# Patient Record
Sex: FEMALE | Race: WHITE
[De-identification: names, ages, dates, MRNs, and addresses within clinical notes are randomized per-mention and may not be internally consistent; named-entity substitution may affect disease eponyms.]

---

## 2018-03-04 ENCOUNTER — HOSPITAL ENCOUNTER (EMERGENCY)
Dept: HOSPITAL 45 - C.EDB | Age: 28
Discharge: HOME | End: 2018-03-04
Payer: COMMERCIAL

## 2018-03-04 VITALS
HEIGHT: 62.01 IN | WEIGHT: 145.51 LBS | HEIGHT: 62.01 IN | BODY MASS INDEX: 26.44 KG/M2 | WEIGHT: 145.51 LBS | BODY MASS INDEX: 26.44 KG/M2

## 2018-03-04 VITALS
DIASTOLIC BLOOD PRESSURE: 96 MMHG | TEMPERATURE: 98.06 F | HEART RATE: 114 BPM | SYSTOLIC BLOOD PRESSURE: 148 MMHG | OXYGEN SATURATION: 97 %

## 2018-03-04 DIAGNOSIS — G43.909: ICD-10-CM

## 2018-03-04 DIAGNOSIS — Z79.899: ICD-10-CM

## 2018-03-04 DIAGNOSIS — F41.9: ICD-10-CM
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
## 2018-03-04 DIAGNOSIS — S60.221A: Primary | ICD-10-CM
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
## 2018-03-04 NOTE — DIAGNOSTIC IMAGING REPORT
RIGHT HAND 3 VIEWS



CLINICAL HISTORY: Right hand injury.



FINDINGS: 3 views of the right hand are obtained. No prior studies are available

for comparison at the time of dictation. The skeletal structures are well

mineralized. No fracture is seen. The joint spaces of the hand are

well-maintained. The overlying soft tissues are normal as imaged.



IMPRESSION: There is no radiographic evidence of right hand fracture.







Electronically signed by:  Min Shannon M.D.

3/4/2018 12:16 PM



Dictated Date/Time:  3/4/2018 12:15 PM

## 2018-03-04 NOTE — EMERGENCY ROOM VISIT NOTE
ED Visit Note


First contact with patient:  11:43


CHIEF COMPLAINT: Right hand injury





HISTORY OF PRESENT ILLNESS: This right-hand-dominant, 27-year-old female 

patient presented to the emergency department, ambulatory, approximately 4 

hours after they injured the right hand by smashing it on a metal rack.  The 

patient states she was attempting to move vegetables in a freezer, when her 

hand slipped, striking the fifth MCP joint and metacarpal against the metal 

rack.  The patient rates the pain as sharp and 4/10. The patient denies any 

numbness or tingling. The patient does not have injuries to the wrist. The 

patient has not had a previous fracture to this hand.





REVIEW OF SYSTEMS: A 6 system review of systems was completed with positives 

and pertinent negatives in the HPI.





ALLERGIES: None





MEDICATIONS: Effexor, Ativan, amitriptyline, magnesium, Imitrex, propranolol





PMH: Migraines, anxiety





SOCIAL HISTORY: The patient lives locally with family.  She denies drug, alcohol

, tobacco use.





PHYSICAL EXAM: Vital Signs: Reviewed Nurse's notes, vital signs stable. GENERAL

: This is a 27-year-old white female, in no acute distress, texting on her cell 

phone, well-developed, well-nourished. MUSCULOSKELETAL: There is no deformity 

of the right hand. There is tenderness and ecchymosis over the fifth metacarpal 

and MCP joint. There is no thenar or hypothenar eminence atrophy. Normal thumb 

opposition to all fingers.  strength 5/5.  Strength against resistance of 

the fifth phalange is 5/5.  There is no laceration. Capillary refill less than 

2 seconds. No tenderness of the fingers or wrist. Full range of motion of the 

wrist, hand, and fingers. No snuff box tenderness. Radial pulse 2+.  NEURO: 

Alert and oriented to person, place, and time. Normal sensation to light and 

sharp touch. 





RADIOLOGY:


RIGHT HAND 3 VIEWS





CLINICAL HISTORY: Right hand injury.





FINDINGS: 3 views of the right hand are obtained. No prior studies are available


for comparison at the time of dictation. The skeletal structures are well


mineralized. No fracture is seen. The joint spaces of the hand are


well-maintained. The overlying soft tissues are normal as imaged.





IMPRESSION: There is no radiographic evidence of right hand fracture.











Electronically signed by:  Min Shannon M.D.


3/4/2018 12:16 PM





Dictated Date/Time:  3/4/2018 12:15 PM








EMERGENCY DEPARTMENT COURSE: I examined the patient. An x-ray of the right hand 

was reviewed by myself and radiologist and shows no acute fracture or 

dislocation.  I discussed the findings with the patient at bedside.  I did 

offer a splint, but did not recommend this, as there is no fracture.  The 

patient declines.  She was offered pain medication, and declined as well.  

Discharge instructions reviewed. The patient was discharged home in good 

condition. 





I attest that I have personally reviewed the patient's current medication list. 


Blood Pressure Screening: Patient was found to have a slightly elevated blood 

pressure due to circumstances. I do not believe that the patient requires 

hypertension monitoring. 





Differential diagnosis includes fracture, sprain, strain, contusion, and others





DIAGNOSIS: Right hand contusion


Current/Historical Medications


Scheduled


Amitriptyline Hcl (Elavil), 25 MG PO HS


Magnesium Oxide (Mag-Ox), 400 MG PO HS


Propranolol (Inderal), 20 MG PO BID


Sumatriptan Succinate (Imitrex), 50 MG PO PRN


Venlafaxine Hcl (Effexor Xr), 1 CAP PO HS





Scheduled PRN


Lorazepam (Ativan), 0.5 MG PO BID PRN for Anxiety





Allergies


Coded Allergies:  


     No Known Allergies (Unverified , 3/4/18)





Vital Signs











  Date Time  Temp Pulse Resp B/P (MAP) Pulse Ox O2 Delivery O2 Flow Rate FiO2


 


3/4/18 11:37 36.7 114 18 148/96 97 Room Air  











Departure Information


Impression





 Primary Impression:  


 Contusion of right hand, initial encounter





Dispostion


Home / Self-Care





Condition


GOOD





Referrals


No Doctor, Assigned (PCP)





Patient Instructions


ED Contusion Hand, My [facility]





Additional Instructions





You were seen in the emergency department today for a contusion of the right 

hand.  As discussed, x-ray did not reveal any obvious fracture, dislocation, or 

other abnormality.





Ibuprofen(Motrin, Advil) may be used for fever or pain.  Use 600mg every six 

hours as needed.  Take with food.  Avoid using more than 2400mg in a 24 hour 

period.  Do not use 2400mg per day for more than three consecutive days without 

physician direction.  Prolonged inappropriate use can lead to stomach upset or 

ulcers.  


(AND/OR)


Acetaminophen(Tylenol) may be used for fever or pain.  Use 1000mg every six 

hours as needed.  Avoid using more than 3000mg in a 24 hour period.  





Ice compresses for 20 minutes at a time four times daily for 2-3 days.





Rest and elevate your injury.





Return to the ER immediately for any numbness, tingling, severe pain, extreme 

swelling in the extremity or as needed.





Follow-up with your primary care physician in 2 to 3 days for a recheck of your 

current condition.